# Patient Record
Sex: MALE | Race: WHITE | Employment: OTHER | URBAN - METROPOLITAN AREA
[De-identification: names, ages, dates, MRNs, and addresses within clinical notes are randomized per-mention and may not be internally consistent; named-entity substitution may affect disease eponyms.]

---

## 2017-06-02 ENCOUNTER — TELEPHONE (OUTPATIENT)
Dept: CARDIOTHORACIC SURGERY | Age: 57
End: 2017-06-02

## 2017-06-02 NOTE — TELEPHONE ENCOUNTER
Rec'd '[employer]' Letter returned as : Return to sender, attempted - not known. Unable to [place]". Called #s in system and had to leave a vm. He identified himself by his name on the VM so it was fine.